# Patient Record
Sex: FEMALE | Race: WHITE | URBAN - METROPOLITAN AREA
[De-identification: names, ages, dates, MRNs, and addresses within clinical notes are randomized per-mention and may not be internally consistent; named-entity substitution may affect disease eponyms.]

---

## 2017-02-26 ENCOUNTER — OFFICE VISIT (OUTPATIENT)
Dept: URGENT CARE | Facility: URGENT CARE | Age: 32
End: 2017-02-26
Payer: COMMERCIAL

## 2017-02-26 VITALS
TEMPERATURE: 97.6 F | OXYGEN SATURATION: 99 % | HEIGHT: 61 IN | DIASTOLIC BLOOD PRESSURE: 72 MMHG | WEIGHT: 116 LBS | BODY MASS INDEX: 21.9 KG/M2 | HEART RATE: 75 BPM | SYSTOLIC BLOOD PRESSURE: 108 MMHG

## 2017-02-26 DIAGNOSIS — Z33.1 PREGNANCY, INCIDENTAL: ICD-10-CM

## 2017-02-26 DIAGNOSIS — R07.0 THROAT PAIN: Primary | ICD-10-CM

## 2017-02-26 DIAGNOSIS — J06.9 VIRAL URI: ICD-10-CM

## 2017-02-26 LAB
DEPRECATED S PYO AG THROAT QL EIA: NORMAL
FLUAV+FLUBV AG SPEC QL: NEGATIVE
FLUAV+FLUBV AG SPEC QL: NORMAL
MICRO REPORT STATUS: NORMAL
SPECIMEN SOURCE: NORMAL
SPECIMEN SOURCE: NORMAL

## 2017-02-26 PROCEDURE — 87081 CULTURE SCREEN ONLY: CPT | Performed by: FAMILY MEDICINE

## 2017-02-26 PROCEDURE — 87880 STREP A ASSAY W/OPTIC: CPT | Performed by: FAMILY MEDICINE

## 2017-02-26 PROCEDURE — 87804 INFLUENZA ASSAY W/OPTIC: CPT | Performed by: FAMILY MEDICINE

## 2017-02-26 PROCEDURE — 99203 OFFICE O/P NEW LOW 30 MIN: CPT | Performed by: FAMILY MEDICINE

## 2017-02-26 NOTE — NURSING NOTE
"Chief Complaint   Patient presents with     Urgent Care     Pt in clinic to have eval for sore throat.     Pharyngitis       Initial /72 (BP Location: Right arm, Patient Position: Chair, Cuff Size: Adult Regular)  Pulse 75  Temp 97.6  F (36.4  C) (Tympanic)  Ht 5' 1\" (1.549 m)  Wt 116 lb (52.6 kg)  SpO2 99%  BMI 21.92 kg/m2 Estimated body mass index is 21.92 kg/(m^2) as calculated from the following:    Height as of this encounter: 5' 1\" (1.549 m).    Weight as of this encounter: 116 lb (52.6 kg).  Medication Reconciliation: complete   Malu Zhang/ MA    "

## 2017-02-26 NOTE — PROGRESS NOTES
"SUBJECTIVE:   Chief Complaint   Patient presents with     Urgent Care     Pt in clinic to have eval for sore throat.     Pharyngitis     Jenny Renetta Wolfe is a 31 year old female presenting with a chief complaint of sore throat, headache and malaise.  Onset of symptoms was 1 day(s) ago.  Course of illness is same.    Severity moderate  Current and Associated symptoms: sore throat and myalgias  Treatment measures tried include None tried.  Predisposing factors include None.  She is pregnant    No past medical history on file.    ALLERGIES:  Review of patient's allergies indicates no known allergies.      Current Outpatient Prescriptions on File Prior to Visit:  desogestrel-ethinyl estradiol (DESOGEN) 0.15-30 MG-MCG per tablet Take 1 tablet by mouth daily. (Patient not taking: Reported on 2/26/2017)   NUVARING 0.12-0.015 MG/24HR VA RING ONE EVERY 25 days, remove , wait 3 days and repeat (Patient not taking: No sig reported)   NUVARING 0.12-0.015 MG/24HR VA RING ONE EVERY 3 WEEKS AS DIRECTED, REMOVE FOR 1 WEEK (Patient not taking: No sig reported)     No current facility-administered medications on file prior to visit.     Social History   Substance Use Topics     Smoking status: Never Smoker     Smokeless tobacco: Not on file     Alcohol use No       Family History   Problem Relation Age of Onset     Hypertension Father      Respiratory Maternal Grandmother      Respiratory Maternal Grandfather          ROS:  INTEGUMENTARY/SKIN: NEGATIVE for worrisome rashes, moles or lesions  EYES: NEGATIVE for vision changes or irritation  GI: NEGATIVE for nausea, abdominal pain, heartburn, or change in bowel habits    OBJECTIVE  :/72 (BP Location: Right arm, Patient Position: Chair, Cuff Size: Adult Regular)  Pulse 75  Temp 97.6  F (36.4  C) (Tympanic)  Ht 5' 1\" (1.549 m)  Wt 116 lb (52.6 kg)  SpO2 99%  BMI 21.92 kg/m2  GENERAL APPEARANCE: alert, mild distress and cooperative  EYES: EOMI,  PERRL, conjunctiva " clear  HENT: ear canals and TM's normal.  Nose and mouth without ulcers, erythema or lesions  NECK: supple, nontender, no lymphadenopathy  RESP: lungs clear to auscultation - no rales, rhonchi or wheezes  CV: regular rates and rhythm, normal S1 S2, no murmur noted  ABDOMEN:  soft, nontender, no HSM or masses and bowel sounds normal  NEURO: Normal strength and tone, sensory exam grossly normal,  normal speech and mentation  SKIN: no suspicious lesions or rashes    Results for orders placed or performed in visit on 02/26/17   Strep, Rapid Screen   Result Value Ref Range    Specimen Description Throat     Rapid Strep A Screen       NEGATIVE: No Group A streptococcal antigen detected by immunoassay, await   culture report.      Micro Report Status FINAL 02/26/2017    Influenza A/B antigen   Result Value Ref Range    Influenza A/B Agn Specimen Nasopharyngeal     Influenza A Negative NEG    Influenza B  NEG     Negative   Test results must be correlated with clinical data. If necessary, results   should be confirmed by a molecular assay or viral culture.           ASSESSMENT:  Throat pain     - Strep, Rapid Screen  - Influenza A/B antigen  - Beta strep group A culture    Viral URI  OTC cough suppressant/expectorant  OTC lozenges or throat spray as needed for sore throat  Acetaminophen / ibuprofen for pain and fever  Rest, drink plenty of fluids    Pregnancy, incidental     Discussed acetaminophen is safe in pregnancy,  Avoid NSAIDS

## 2017-02-26 NOTE — MR AVS SNAPSHOT
"              After Visit Summary   2017    Jenny Wolfe    MRN: [id number]           Patient Information     Date Of Birth          1985        Visit Information        Provider Department      2017 11:15 AM Bailee Ryder MD [facility]        Today's Diagnoses     Throat pain    -  1    Viral URI        Pregnancy, incidental           Follow-ups after your visit        Who to contact     If you have questions or need follow up information about today's clinic visit or your schedule please contact [facility] directly at [phone].  Normal or non-critical lab and imaging results will be communicated to you by MyChart, letter or phone within 4 business days after the clinic has received the results. If you do not hear from us within 7 days, please contact the clinic through [email or web address] or phone. If you have a critical or abnormal lab result, we will notify you by phone as soon as possible.  Submit refill requests through [employer] or call your pharmacy and they will forward the refill request to us. Please allow 3 business days for your refill to be completed.          Additional Information About Your Visit        MyChart Information     [employer] lets you send messages to your doctor, view your test results, renew your prescriptions, schedule appointments and more. To sign up, go to [email or web address] . Click on \"Log in\" on the left side of the screen, which will take you to the Welcome page. Then click on \"Sign up Now\" on the right side of the page.     You will be asked to enter the access code listed below, as well as some personal information. Please follow the directions to create your username and password.     Your access code is: [email or web address]  Expires: 2017  1:50 PM     Your access code will  in 90 days. If you need help or a new code, please call your [place] clinic or [phone].        Care EveryWhere ID " "    This is your Care EveryWhere ID. This could be used by other organizations to access your [facility] medical records  [id number]        Your Vitals Were     Pulse Temperature Height Pulse Oximetry BMI (Body Mass Index)       75 97.6  F (36.4  C) (Tympanic) 5' 1\" (1.549 m) 99% 21.92 kg/m2        Blood Pressure from Last 3 Encounters:   02/26/17 108/72   01/12/10 102/62   12/15/08 96/58    Weight from Last 3 Encounters:   02/26/17 116 lb (52.6 kg)   01/12/10 121 lb (54.9 kg)   12/15/08 126 lb (57.2 kg)              We Performed the Following     Beta strep group A culture     Influenza A/B antigen     Strep, Rapid Screen        Primary Care Provider    None       No address on file        Thank you!     Thank you for choosing [facility]  for your care. Our goal is always to provide you with excellent care. Hearing back from our patients is one way we can continue to improve our services. Please take a few minutes to complete the written survey that you may receive in the mail after your visit with us. Thank you!             Your Updated Medication List - Protect others around you: Learn how to safely use, store and throw away your medicines at www.disposemymeds.org.          This list is accurate as of: 2/26/17  1:50 PM.  Always use your most recent med list.                   Brand Name Dispense Instructions for use    desogestrel-ethinyl estradiol 0.15-30 MG-MCG per tablet    DESOGEN    1 Package    Take 1 tablet by mouth daily.       * NUVARING 0.12-0.015 MG/24HR vaginal ring   Generic drug:  etonogestrel-ethinyl estradiol     3 months    ONE EVERY 3 WEEKS AS DIRECTED, REMOVE FOR 1 WEEK       * NUVARING 0.12-0.015 MG/24HR vaginal ring   Generic drug:  etonogestrel-ethinyl estradiol     3    ONE EVERY 25 days, remove , wait 3 days and repeat       * Notice:  This list has 2 medication(s) that are the same as other medications prescribed for you. Read the directions carefully, and ask your " doctor or other care provider to review them with you.

## 2017-02-28 LAB
BACTERIA SPEC CULT: NORMAL
MICRO REPORT STATUS: NORMAL
SPECIMEN SOURCE: NORMAL